# Patient Record
Sex: MALE | Race: WHITE | NOT HISPANIC OR LATINO | Employment: OTHER | URBAN - METROPOLITAN AREA
[De-identification: names, ages, dates, MRNs, and addresses within clinical notes are randomized per-mention and may not be internally consistent; named-entity substitution may affect disease eponyms.]

---

## 2022-03-01 ENCOUNTER — ESTABLISHED PATIENT (OUTPATIENT)
Dept: URBAN - METROPOLITAN AREA CLINIC 27 | Facility: CLINIC | Age: 72
End: 2022-03-01

## 2022-03-01 DIAGNOSIS — H52.02: ICD-10-CM

## 2022-03-01 DIAGNOSIS — H26.493: ICD-10-CM

## 2022-03-01 DIAGNOSIS — E10.9: ICD-10-CM

## 2022-03-01 PROCEDURE — 92015 DETERMINE REFRACTIVE STATE: CPT

## 2022-03-01 PROCEDURE — 92014 COMPRE OPH EXAM EST PT 1/>: CPT

## 2022-03-01 ASSESSMENT — VISUAL ACUITY
OD_SC: 20/20
OS_CC: 20/25
OS_SC: 20/20
OD_CC: 20/30
OD_SC: 20/50
OS_SC: 20/70

## 2022-03-01 ASSESSMENT — TONOMETRY
OS_IOP_MMHG: 11
OD_IOP_MMHG: 13

## 2022-03-01 NOTE — PATIENT DISCUSSION
RTC 6  mo for evaluation.  Patient has borderline PCO.  Having neck surgery this month and 3 mo of recovery.  Prefers to hold off on YAG for now.

## 2022-09-06 NOTE — PATIENT DISCUSSION
Cataract surgery was done in [place] about 5 years ago.  Patient has never been happy with MF due to glare with lights.

## 2023-01-31 NOTE — PATIENT DISCUSSION
increased CD ratios and sloped rim  OU. VF within normal, OCT good RNFL, Good IOP with slightly thin PACH's.